# Patient Record
Sex: MALE | Race: WHITE
[De-identification: names, ages, dates, MRNs, and addresses within clinical notes are randomized per-mention and may not be internally consistent; named-entity substitution may affect disease eponyms.]

---

## 2019-01-31 ENCOUNTER — HOSPITAL ENCOUNTER (EMERGENCY)
Dept: HOSPITAL 89 - ER | Age: 34
Discharge: HOME | End: 2019-01-31
Payer: COMMERCIAL

## 2019-01-31 VITALS — DIASTOLIC BLOOD PRESSURE: 72 MMHG | SYSTOLIC BLOOD PRESSURE: 115 MMHG

## 2019-01-31 DIAGNOSIS — N20.1: Primary | ICD-10-CM

## 2019-01-31 LAB — PLATELET COUNT, AUTOMATED: 305 K/UL (ref 150–450)

## 2019-01-31 PROCEDURE — 82435 ASSAY OF BLOOD CHLORIDE: CPT

## 2019-01-31 PROCEDURE — 96376 TX/PRO/DX INJ SAME DRUG ADON: CPT

## 2019-01-31 PROCEDURE — 99284 EMERGENCY DEPT VISIT MOD MDM: CPT

## 2019-01-31 PROCEDURE — 82947 ASSAY GLUCOSE BLOOD QUANT: CPT

## 2019-01-31 PROCEDURE — 82565 ASSAY OF CREATININE: CPT

## 2019-01-31 PROCEDURE — 96375 TX/PRO/DX INJ NEW DRUG ADDON: CPT

## 2019-01-31 PROCEDURE — 85025 COMPLETE CBC W/AUTO DIFF WBC: CPT

## 2019-01-31 PROCEDURE — 96374 THER/PROPH/DIAG INJ IV PUSH: CPT

## 2019-01-31 PROCEDURE — 84520 ASSAY OF UREA NITROGEN: CPT

## 2019-01-31 PROCEDURE — 82310 ASSAY OF CALCIUM: CPT

## 2019-01-31 PROCEDURE — 82374 ASSAY BLOOD CARBON DIOXIDE: CPT

## 2019-01-31 PROCEDURE — 74176 CT ABD & PELVIS W/O CONTRAST: CPT

## 2019-01-31 PROCEDURE — 84132 ASSAY OF SERUM POTASSIUM: CPT

## 2019-01-31 PROCEDURE — 84295 ASSAY OF SERUM SODIUM: CPT

## 2019-01-31 NOTE — RADIOLOGY IMAGING REPORT
[place] [facility] [place] [place] 

 

PATIENT NAME: Jeremie Wagoner

: 1985

MR: [id number]

V: [id number]

EXAM DATE: 

ORDERING PHYSICIAN: JACINTO BLUNT

TECHNOLOGIST: 

 

Location: [facility] [place]

Patient: Jeremie Wagoner

: 1985

MRN: [id number]

Visit/Account:[id number]

Date of Sevice:  2019

 

ACCESSION #: [id number]

 

EXAMINATION:

CT abdomen without IV contrast

CT pelvis without IV contrast

 

HISTORY:   Left flank pain.

 

COMPARISON:   None.

 

TECHNIQUE:  Axial images were taken through the abdomen and pelvis without intravenous contrast.  Sag
ittal and coronal reformatted images are also submitted.

 

One of the following dose optimization techniques was utilized in the performance of this exam: Autom
ated exposure control; adjustment of the mA and/or kV according to the patient's size; or use of an i
terative  reconstruction technique.  Specific details can be referenced in the facility's radiology C
T exam operational policy.

 

FINDINGS:

Please note that without intravenous contrast, sensitivity to detection of parenchymal disease is weinstein
ited.

 

Liver/biliary: Status post cholecystectomy. The liver is unremarkable.

Pancreas: Negative.

Spleen: Negative.

Adrenal glands: Negative.

Kidneys: Calculus in the distal left ureter measuring 9 x 8 x 7 mm. No hydronephrosis.

Pelvic  structures: Negative.

 

Bowel: Colonic diverticulosis without evidence of diverticulitis. Normal appendix.

Peritoneum/retroperitoneum/mesenteries: No intraperitoneal free air or free fluid.

 

Vessels: Negative.

 

Musculoskeletal/body wall: Posterior fusion hardware in the thoracic spine is partially visualized. M
ild multilevel disc degenerative changes in the lumbar spine.

 

Lymph node assessment: Negative.

 

Lower chest: Negative.

 

IMPRESSION:

Calculus in the distal left ureter measuring 9 x 8 x 7 mm. No hydronephrosis.

 

Report Dictated By: Carlyle Ramsay MD at 2019 4:25 PM

 

Report E-Signed By: Carlyle Ramsay MD  at 2019 4:36 PM

 

WSN:[id number]

## 2019-01-31 NOTE — ER REPORT
History and Physical


Time Seen By MD:  15:57


HPI/ROS


CHIEF COMPLAINT: L flank pain





HISTORY OF PRESENT ILLNESS: PT started with L flank pain at 230pm today while 


urinating. PT said it came on suddenly and is 10/10. Pt has a hx of kidney stone


in 2006 but states this feels more painful but in similar location.  Had no 


dysuria. no fevers. Pt had two old hydrocodones so to both and sat on couch. 


Pain did not improve so came to ed. Pt c/o of nauesea. no vomiting. Pt unable to


get comfortable. PRior kidney stone passed on its own. 





REVIEW OF SYSTEMS:


Constitutional: No fever, no chills.


Eyes: No discharge.


ENT: No sore throat. 


Cardiovascular: No chest pain, no palpitations.


Respiratory: No cough, no shortness of breath.


Gastrointestinal: No abdominal pain, no vomiting.


Genitourinary: No hematuria.


Musculoskeletal: + back pain.


Skin: No rashes.


Neurological: No headache.


Allergies:  


Coded Allergies:  


     Penicillins (Verified  Allergy, Severe, UNKNOWN, 1/31/19)


     meperidine (Verified  Allergy, Severe, HALLUCINATIONS, 1/31/19)


Home Meds


Active Scripts


Cyclobenzaprine Hcl (CYCLOBENZAPRINE HCL) 10 Mg Tablet, 10 MG PO TID PRN for 


MUSCLE SPASMS, #15 TAB


   Prov:GENET MOSQUEDA         1/14/16


Discontinued Reported Medications


Ibuprofen (IBUPROFEN) 800 Mg Tablet, 1 TAB PO Q2H, TAB


   1/14/16


Ranitidine Hcl (RANITIDINE HCL) 150 Mg Capsule, 150 MG PO QDAY, CAPSULE


   1/14/16


Esomeprazole Magnesium (NEXIUM) 10 Mg Suspdr.pkt, 1 TAB PO QDAY, TAB


   1/14/16


Discontinued Scripts


Hydrocodone Bit/Acetaminophen (NORCO 5-325 TABLET) 1 Each Tablet, 1 EACH PO Q4-


6H PRN for PAIN, #12 TAB


   Prov:GENET MOSQUEDA         1/14/16


Past Medical/Surgical History


pmhx: Kidney stones, "broken back"


Pshx: lumbar fusion


Reviewed Nurses Notes:  Yes


Old Medical Records Reviewed:  Yes


Hx Smoking:  No


Smoking Status:  Never Smoker


Hx Substance Use Disorder:  No


Hx Alcohol Use:  No


Constitutional





Vital Sign - Last 24 Hours








 1/31/19 1/31/19 1/31/19 1/31/19





 15:51 16:04 16:10 16:21


 


Pulse 111  111 92


 


Resp   22 


 


B/P (MAP)  143/116 (125) 149/116 


 


Pulse Ox 90  95 90


 


O2 Delivery Room Air  Room Air Room Air





 1/31/19 1/31/19 1/31/19 1/31/19





 16:26 16:30 16:56 17:00


 


Pulse 92  120 


 


B/P (MAP)  125/82 (96)  135/82 (99)


 


Pulse Ox 93  94 


 


O2 Delivery Room Air  Room Air 








Physical Exam


General Appearance: The patient is alert, has no immediate need for airway 


protection and no signs of toxicity.


Eyes: Pupils equal and round no pallor or injection, EOMI


ENT:  no pharyngeal erythema or exudates, Mucous membranes are moist


Respiratory: There are no retractions, lungs are clear to auscultation.


Cardiovascular: Regular rate and rhythm. pulses are equal and symmetrical


Gastrointestinal:  Abdomen is soft and non tender, no masses, bowel sounds 


normal, no guarding, no rigidity or rebound


Neurological: Cranial nerves II-XII grossly intact, no sensory or motor loss


Skin: Warm and dry, no rashes.


Musculoskeletal: Neck is supple non tender, no vertebral tenderness, L flank 


pain


Extremities are non tender, nonswollen and have full range of motion.








DIFFERENTIAL DIAGNOSIS: After history and physical exam differential diagnosis 


was considered for kidney stone





Medical Decision Making


Data Points


Result Diagram:  


1/31/19 1604





Laboratory





Hematology








Test


 1/31/19


16:04


 


Sodium Level


 141 mmol/L


(137-145)


 


Potassium Level


 3.6 mmol/L


(3.5-5.0)


 


Chloride Level


 111 mmol/L


()


 


Carbon Dioxide Level


 22 mmol/L


(22-30)


 


Blood Urea Nitrogen


 18 mg/dl


(9-21)


 


Creatinine


 1.20 mg/dl


(0.66-1.25)


 


Glomerular Filtration Rate


Calc > 60.0 





 


Random Glucose


 104 mg/dl


()


 


Calcium Level


 10.0 mg/dl


(8.4-10.2)








Chemistry








Test


 1/31/19


16:04


 


Glomerular Filtration Rate


Calc > 60.0 





 


Calcium Level


 10.0 mg/dl


(8.4-10.2)











EKG/Imaging


Imaging


9 x7x 8mm kidney stone





ED Course/Re-evaluation


ED Course


CT for stone location and size





 01/31/2019 5:39:00 pm Spoke with Dr. Garcia, urologist. Reviewed the CT and pt 


information. States if pt is comfortable he can go home. He can see him in 


office tomorrow to discuss procedure for Monday to remove stone.  Spoke with pt 


and pt can see him tomorrow but is unable to have a procedure to to [facility] 


interview on Monday but will discuss alternative time with urologist tomorrow.


Decision to Disposition Date:  Jan 31, 2019


Decision to Disposition Time:  17:40





Depart


Departure


Latest Vital Signs





Vital Signs








  Date Time  Temp Pulse Resp B/P (MAP) Pulse Ox O2 Delivery O2 Flow Rate FiO2


 


1/31/19 17:00    135/82 (99)    


 


1/31/19 16:56  120   94 Room Air  


 


1/31/19 16:10   22     








Impression:  


   Primary Impression:  


   Kidney stone on left side


Condition:  Improved


Disposition:  HOME OR SELF-CARE


Referrals:  


ZULEMA MCLEOD PA-C (PCP)











BRANDON GARCIA MD


New Scripts


Ondansetron 4 Mg Odt (ONDANSETRON 4 MG ODT) 4 Mg Tab.rapdis


4 MG PO Q6-8H PRN for NAUSEA/VOMITING, #15 TAB


   Prov: JACINTO BLUNT DO         1/31/19 


Tamsulosin Hcl (FLOMAX) 0.4 Mg Cap.er.24h


0.4 MG PO DAILY for 5 Days, #5 CAP


   start 2/1/2019


   Prov: JACINTO BLUNT DO         1/31/19


Departure Forms:  ER Transition Record, Medications Reconciliation,    Patient 


Portal Information


Patient Instructions:  Kidney Stones (ED)





Additional Instructions:  


I spoke with Dr. Garcia, urology, who would like you to call his office tomorrow


at 8:30 am to arrange a time to be seen tomorrow.  You have a large (9 x8x7mm) 


stone in your distal ureter which will most likely need a procedure to pass. 


Percocet 1-2 every 4 hours as needed for pain.


Flomax once a day.


zofran one every 6 hours as needed for nausea








If pain is uncontrolled by the percocet or if you are vomiting and unable to 


take the percocet then please return to the emergency room.











JACINTO BLUNT DO            Jan 31, 2019 15:57

## 2019-02-07 ENCOUNTER — HOSPITAL ENCOUNTER (OUTPATIENT)
Dept: HOSPITAL 89 - OR | Age: 34
Discharge: HOME | End: 2019-02-07
Attending: UROLOGY
Payer: COMMERCIAL

## 2019-02-07 VITALS — SYSTOLIC BLOOD PRESSURE: 132 MMHG | DIASTOLIC BLOOD PRESSURE: 90 MMHG

## 2019-02-07 VITALS — HEIGHT: 77 IN | WEIGHT: 262 LBS | BODY MASS INDEX: 30.94 KG/M2

## 2019-02-07 VITALS — DIASTOLIC BLOOD PRESSURE: 72 MMHG | SYSTOLIC BLOOD PRESSURE: 114 MMHG

## 2019-02-07 VITALS — DIASTOLIC BLOOD PRESSURE: 81 MMHG | SYSTOLIC BLOOD PRESSURE: 110 MMHG

## 2019-02-07 VITALS — DIASTOLIC BLOOD PRESSURE: 81 MMHG | SYSTOLIC BLOOD PRESSURE: 119 MMHG

## 2019-02-07 DIAGNOSIS — N20.1: Primary | ICD-10-CM

## 2019-02-07 PROCEDURE — 88300 SURGICAL PATH GROSS: CPT

## 2019-02-07 PROCEDURE — 52356 CYSTO/URETERO W/LITHOTRIPSY: CPT

## 2019-02-07 PROCEDURE — 76000 FLUOROSCOPY <1 HR PHYS/QHP: CPT

## 2019-02-07 PROCEDURE — 82365 CALCULUS SPECTROSCOPY: CPT

## 2019-02-07 NOTE — OPERATIVE REPORT 1
EVENT DATE: February 7, 2019

SURGEON: Scott Hpoe MD 

ANESTHESIOLOGIST: Villa Muñoz MD

ANESTHESIA: LMA/General.

ASSISTANT: None.





PREOPERATIVE DIAGNOSIS  

Left distal ureteral calculus.



POSTOPERATIVE DIAGNOSIS 

Left distal ureteral calculus.



PROCEDURE PERFORMED 

Left ureteroscopic laser lithotripsy, stone extraction and stent placement. 



DESCRIPTION OF PROCEDURE 

The patient was brought to the operating room and after the adequate induction 

of general anesthesia he was placed in the relaxed dorsal lithotomy position.  

Genitalia were scrubbed, prepped and draped in a sterile fashion, the bladder 

examined with the rigid cystoscope.  No mucosal abnormalities were noted.  The 

Sensor wire was advanced into the left ureteral orifice past the stone and up 

into the left renal pelvis.  Its position was confirmed fluoroscopically.  The 

rigid ureteroscope was then backloaded over the Sensor wire and advanced into 

the ureteral orifice.  Here, the stone was identified in the distal ureter.  

Using the 365 Holmium laser fiber, the stone was thoroughly fragmented into 

innumerable pieces.  Several of the larger pieces were recovered and sent for 

stone analysis.  A 24 cm, 6-Welsh, double J stent was then positioned under 

cystoscopic and fluoroscopic guidance.  The pull-out string was left in place 

and secured to the penis with a Tegaderm. A B and O suppository was 

administered. He was aroused from anesthesia and transported to PACU in stable 

condition. 

MARYBEL

## 2019-02-07 NOTE — UROLOGY DISCHARGE SUMMARY
Discharge Summary


Reason for Hosp/Final Diag:  


(1) Kidney stone on left side


*Optional Permanent Comment*:  Status post left ureteroscopic laser lithotripsy 


stone extraction stent placement  Last Edited By: Brandon Garcia MD on Feb 7, 2019 10:39


Status:  Resolved


Departure


Weight (Pounds):  262


Condition:  Improved


Discharge:  Home


Time Spent:  < 30 min





Discharge Instructions


Home Meds


Active Scripts


Ondansetron 4 Mg Odt (ONDANSETRON 4 MG ODT) 4 Mg Tab.rapdis, 4 MG PO Q6-8H PRN 


for NAUSEA/VOMITING, #15 TAB


   Prov:LEILANIFRANCISCOJACINTO V DO         1/31/19


Tamsulosin Hcl (FLOMAX) 0.4 Mg Cap.er.24h, 0.4 MG PO DAILY for 5 Days, #5 CAP


   start 2/1/2019


   Prov:LEILANIFRANCISCOJACINTO CARRIE WHEAT         1/31/19


Reported Medications


Oxycodone Hcl/Acetaminophen (PERCOCET 5-325 MG TABLET) 1 Each Tablet, 1 EACH PO 


Q4-6H PRN for PAIN, #12 TAB


   2/7/19


Sertraline Hcl (SERTRALINE HCL) 25 Mg Tablet, 1 TAB PO BID, TAB


   2/4/19


Cyclobenzaprine Hcl (CYCLOBENZAPRINE HCL) 5 Mg Tablet, 10 MG PO BID PRN for 


PAIN, #18 TAB


   2/4/19


Discontinued Reported Medications


Ibuprofen (IBUPROFEN) 800 Mg Tablet, 1 TAB PO Q2H, TAB


   1/14/16


Ranitidine Hcl (RANITIDINE HCL) 150 Mg Capsule, 150 MG PO QDAY, CAPSULE


   1/14/16


Esomeprazole Magnesium (NEXIUM) 10 Mg Suspdr.pkt, 1 TAB PO QDAY, TAB


   1/14/16


Discontinued Scripts


Oxycodone Hcl/Acetaminophen (OXYCODONE-ACETAMINOPHEN 5-325) 1 Each Tablet, 1 


EACH PO Q4-6H PRN for PAIN, #30 TAB


   Prov:JOSE RAULJACINTO CARRIE WHEAT         1/31/19


Cyclobenzaprine Hcl (CYCLOBENZAPRINE HCL) 10 Mg Tablet, 10 MG PO TID PRN for 


MUSCLE SPASMS, #15 TAB


   Prov:GENET MOSQUEDA FNP         1/14/16


Hydrocodone Bit/Acetaminophen (NORCO 5-325 TABLET) 1 Each Tablet, 1 EACH PO Q4-6


H PRN for PAIN, #12 TAB


   Prov:GENET MOSQUEDA FNP         1/14/16


Special Instructions:  


Expect to have blood in the urine, frequent urge to urinate, pain in the


left flank with urination.





Try not to pull out the stent by accident





Call me for any problems or concerns [phone]





Venous Thromboembolism


Antithrombotics


Is Pt On Any Antithrombotics?:  No











BRANDON GARCIA MD                Feb 7, 2019 10:39

## 2019-02-07 NOTE — RADIOLOGY IMAGING REPORT
FACILITY: [facility] 

 

PATIENT NAME: Jeremie Wagoner

: 1985

MR: [id number]

V: [id number]

EXAM DATE: 

ORDERING PHYSICIAN: BRANDON GARCIA

TECHNOLOGIST: 

 

Location: [facility]

Patient: Jeremie Wagoner

: 1985

MRN: [id number]

Visit/Account:[id number]

Date of Sevice:  2019

 

ACCESSION #: [id number]

 

C-ARM FLUORO 1 HR

 

Indication: STENT PLACEMTENT

 

Comparison: None.

 

Findings: Intraoperative images are available, which demonstrated a left sided ureteral double-J sten
t placed.  Final image demonstrates good position.

 

IMPRESSION: Left-sided ureteral double-J stent placement.

 

Radiation dose: AK 5.80 mGy

 

Report Dictated By: Adrián Marrero at 2019 10:51 AM

 

Report E-Signed By: Adrián Marrero  at 2019 10:52 AM

 

WSN:[id number]

## 2019-02-12 ENCOUNTER — HOSPITAL ENCOUNTER (OUTPATIENT)
Dept: HOSPITAL 89 - RAD | Age: 34
End: 2019-02-12
Attending: UROLOGY
Payer: COMMERCIAL

## 2019-02-12 DIAGNOSIS — N20.0: Primary | ICD-10-CM

## 2019-02-12 DIAGNOSIS — Z96.0: ICD-10-CM

## 2019-02-12 PROCEDURE — 74018 RADEX ABDOMEN 1 VIEW: CPT

## 2019-02-12 NOTE — RADIOLOGY IMAGING REPORT
FACILITY: [facility] 

 

PATIENT NAME: Jeremie Wagoner

: 1985

MR: [id number]

V: [id number]

EXAM DATE: 

ORDERING PHYSICIAN: BRANDON GARCIA

TECHNOLOGIST: 

 

Location: [facility]

Patient: Jeremie Wagoner

: 1985

MRN: [id number]

Visit/Account:[id number]

Date of Sevice:  2019

 

ACCESSION #: [id number]

 

KUB SINGLE VIEW ABDOMEN

 

HISTORY:  kidney stone

 

Additional history:  None

 

COMPARISON:  CT abdomen pelvis 2019 which demonstrated a 8 mm calculus lodged in the distal left
 ureter

 

FINDINGS:

 

There is been interval placement of a left ureteral stent.  The moderately large stone in the distal 
left ureter is unchanged in position and overlies the stent.  No stones are identified in the kidneys
 on the previous CT.  The stent appears appropriately positioned.

 

IMPRESSION:

 

Interval placement of a left ureteral stent which appears well-positioned.  The large stone in the di
stal left ureter is unchanged in position.

 

Report Dictated By: Tim Rudd MD at 2019 9:47 AM

 

Report E-Signed By: Tim Rudd MD  at 2019 9:49 AM

 

WSN:EMILY